# Patient Record
Sex: FEMALE | URBAN - METROPOLITAN AREA
[De-identification: names, ages, dates, MRNs, and addresses within clinical notes are randomized per-mention and may not be internally consistent; named-entity substitution may affect disease eponyms.]

---

## 2018-01-23 ENCOUNTER — APPOINTMENT (RX ONLY)
Dept: URBAN - METROPOLITAN AREA CLINIC 87 | Facility: CLINIC | Age: 73
Setting detail: DERMATOLOGY
End: 2018-01-23

## 2018-01-23 DIAGNOSIS — L81.4 OTHER MELANIN HYPERPIGMENTATION: ICD-10-CM

## 2018-01-23 DIAGNOSIS — D485 NEOPLASM OF UNCERTAIN BEHAVIOR OF SKIN: ICD-10-CM

## 2018-01-23 DIAGNOSIS — D22 MELANOCYTIC NEVI: ICD-10-CM

## 2018-01-23 DIAGNOSIS — L82.1 OTHER SEBORRHEIC KERATOSIS: ICD-10-CM

## 2018-01-23 DIAGNOSIS — L21.8 OTHER SEBORRHEIC DERMATITIS: ICD-10-CM

## 2018-01-23 DIAGNOSIS — L90.5 SCAR CONDITIONS AND FIBROSIS OF SKIN: ICD-10-CM

## 2018-01-23 PROBLEM — F41.9 ANXIETY DISORDER, UNSPECIFIED: Status: ACTIVE | Noted: 2018-01-23

## 2018-01-23 PROBLEM — F32.9 MAJOR DEPRESSIVE DISORDER, SINGLE EPISODE, UNSPECIFIED: Status: ACTIVE | Noted: 2018-01-23

## 2018-01-23 PROBLEM — I10 ESSENTIAL (PRIMARY) HYPERTENSION: Status: ACTIVE | Noted: 2018-01-23

## 2018-01-23 PROBLEM — L85.3 XEROSIS CUTIS: Status: ACTIVE | Noted: 2018-01-23

## 2018-01-23 PROBLEM — K21.9 GASTRO-ESOPHAGEAL REFLUX DISEASE WITHOUT ESOPHAGITIS: Status: ACTIVE | Noted: 2018-01-23

## 2018-01-23 PROBLEM — D23.72 OTHER BENIGN NEOPLASM OF SKIN OF LEFT LOWER LIMB, INCLUDING HIP: Status: ACTIVE | Noted: 2018-01-23

## 2018-01-23 PROBLEM — D48.5 NEOPLASM OF UNCERTAIN BEHAVIOR OF SKIN: Status: ACTIVE | Noted: 2018-01-23

## 2018-01-23 PROBLEM — M12.9 ARTHROPATHY, UNSPECIFIED: Status: ACTIVE | Noted: 2018-01-23

## 2018-01-23 PROBLEM — D22.72 MELANOCYTIC NEVI OF LEFT LOWER LIMB, INCLUDING HIP: Status: ACTIVE | Noted: 2018-01-23

## 2018-01-23 PROCEDURE — ? BIOPSY BY SHAVE METHOD

## 2018-01-23 PROCEDURE — ? COUNSELING

## 2018-01-23 PROCEDURE — 11100: CPT

## 2018-01-23 PROCEDURE — 99203 OFFICE O/P NEW LOW 30 MIN: CPT | Mod: 25

## 2018-01-23 PROCEDURE — ? OTHER

## 2018-01-23 PROCEDURE — ? DEFER

## 2018-01-23 ASSESSMENT — LOCATION SIMPLE DESCRIPTION DERM
LOCATION SIMPLE: LEFT CHEEK
LOCATION SIMPLE: RIGHT EAR
LOCATION SIMPLE: RIGHT THIGH
LOCATION SIMPLE: ANTERIOR SCALP
LOCATION SIMPLE: RIGHT FOREARM
LOCATION SIMPLE: LEFT SCALP
LOCATION SIMPLE: LEFT FOREARM
LOCATION SIMPLE: LEFT FOREHEAD
LOCATION SIMPLE: ABDOMEN
LOCATION SIMPLE: LEFT EAR
LOCATION SIMPLE: CHEST
LOCATION SIMPLE: RIGHT CHEEK
LOCATION SIMPLE: LEFT THIGH
LOCATION SIMPLE: LEFT LOWER BACK

## 2018-01-23 ASSESSMENT — LOCATION DETAILED DESCRIPTION DERM
LOCATION DETAILED: LEFT ANTERIOR PROXIMAL THIGH
LOCATION DETAILED: LEFT LATERAL SUPERIOR CHEST
LOCATION DETAILED: MID-FRONTAL SCALP
LOCATION DETAILED: LEFT PROXIMAL DORSAL FOREARM
LOCATION DETAILED: LEFT ANTERIOR LATERAL DISTAL THIGH
LOCATION DETAILED: RIGHT LATERAL SUPERIOR CHEST
LOCATION DETAILED: PERIUMBILICAL SKIN
LOCATION DETAILED: LEFT INFERIOR CENTRAL MALAR CHEEK
LOCATION DETAILED: RIGHT CRUS OF HELIX
LOCATION DETAILED: LEFT MEDIAL FRONTAL SCALP
LOCATION DETAILED: LEFT POSTERIOR EAR
LOCATION DETAILED: RIGHT INFERIOR CENTRAL MALAR CHEEK
LOCATION DETAILED: RIGHT ANTERIOR DISTAL THIGH
LOCATION DETAILED: RIGHT POSTERIOR EAR
LOCATION DETAILED: LEFT INFERIOR MEDIAL FOREHEAD
LOCATION DETAILED: LEFT CYMBA CONCHA
LOCATION DETAILED: LEFT SUPERIOR LATERAL MIDBACK
LOCATION DETAILED: RIGHT PROXIMAL RADIAL DORSAL FOREARM
LOCATION DETAILED: LEFT CENTRAL BUCCAL CHEEK

## 2018-01-23 ASSESSMENT — LOCATION ZONE DERM
LOCATION ZONE: FACE
LOCATION ZONE: ARM
LOCATION ZONE: TRUNK
LOCATION ZONE: EAR
LOCATION ZONE: LEG
LOCATION ZONE: SCALP

## 2018-01-23 NOTE — PROCEDURE: BIOPSY BY SHAVE METHOD
Bill [id number] For Specimen Handling/Conveyance To Laboratory?: no
Notification Instructions: Patient will be notified of biopsy results. However, patient instructed to call the office if not contacted within 2 weeks.
Biopsy Type: H and E
Body Location Override (Optional - Billing Will Still Be Based On Selected Body Map Location If Applicable): left chin
Lab: [id number]
Hemostasis: Aluminum Chloride
Silver Nitrate Text: The wound bed was treated with silver nitrate after the biopsy was performed.
Path Notes (To The Dermatopathologist): Check margins
Electrodesiccation And Curettage Text: The wound bed was treated with electrodesiccation and curettage after the biopsy was performed.
Consent: Verbal consent was obtained and risks were reviewed including but not limited to scarring, infection, bleeding, scabbing, incomplete removal, nerve damage and allergy to anesthesia.
Biopsy Method: Dermablade
Additional Anesthesia Volume In Cc (Will Not Render If 0): 0
Type Of Destruction Used: Curettage
Post-Care Instructions: I reviewed with the patient in detail post-care instructions. Patient is to keep the biopsy site dry overnight, and then apply vaseline twice daily until healed.
Electrodesiccation Text: The wound bed was treated with electrodesiccation after the biopsy was performed.
Cryotherapy Text: The wound bed was treated with cryotherapy after the biopsy was performed.
Anesthesia Volume In Cc (Will Not Render If 0): 0.5
Lab Facility: [id number]
Detail Level: Detailed
Wound Care: Aquaphor
Anesthesia Type: 1% lidocaine with epinephrine
Dressing: bandage
Curettage Text: The wound bed was treated with curettage after the biopsy was performed.
Billing Type: Third-Party Bill
Size Of Lesion In Cm: 0.3

## 2018-02-12 ENCOUNTER — APPOINTMENT (RX ONLY)
Dept: URBAN - METROPOLITAN AREA CLINIC 87 | Facility: CLINIC | Age: 73
Setting detail: DERMATOLOGY
End: 2018-02-12

## 2018-02-12 DIAGNOSIS — L57.0 ACTINIC KERATOSIS: ICD-10-CM

## 2018-02-12 PROCEDURE — ? OTHER

## 2018-02-12 PROCEDURE — 17003 DESTRUCT PREMALG LES 2-14: CPT

## 2018-02-12 PROCEDURE — ? COUNSELING

## 2018-02-12 PROCEDURE — ? LIQUID NITROGEN

## 2018-02-12 PROCEDURE — 17000 DESTRUCT PREMALG LESION: CPT

## 2018-02-12 ASSESSMENT — LOCATION DETAILED DESCRIPTION DERM
LOCATION DETAILED: LEFT UPPER CUTANEOUS LIP
LOCATION DETAILED: LEFT CENTRAL BUCCAL CHEEK

## 2018-02-12 ASSESSMENT — LOCATION SIMPLE DESCRIPTION DERM
LOCATION SIMPLE: LEFT LIP
LOCATION SIMPLE: LEFT CHEEK

## 2018-02-12 ASSESSMENT — LOCATION ZONE DERM
LOCATION ZONE: FACE
LOCATION ZONE: LIP

## 2018-02-12 NOTE — PROCEDURE: OTHER
Detail Level: Zone
Other (Free Text): Biopsy proven. LN2 recommended and completed.
Note Text (......Xxx Chief Complaint.): This diagnosis correlates with the

## 2018-05-14 ENCOUNTER — APPOINTMENT (RX ONLY)
Dept: URBAN - METROPOLITAN AREA CLINIC 87 | Facility: CLINIC | Age: 73
Setting detail: DERMATOLOGY
End: 2018-05-14

## 2018-05-14 DIAGNOSIS — B02.9 ZOSTER WITHOUT COMPLICATIONS: ICD-10-CM

## 2018-05-14 DIAGNOSIS — L82.1 OTHER SEBORRHEIC KERATOSIS: ICD-10-CM

## 2018-05-14 DIAGNOSIS — L57.0 ACTINIC KERATOSIS: ICD-10-CM

## 2018-05-14 PROBLEM — J45.909 UNSPECIFIED ASTHMA, UNCOMPLICATED: Status: ACTIVE | Noted: 2018-05-14

## 2018-05-14 PROCEDURE — ? PRESCRIPTION

## 2018-05-14 PROCEDURE — ? TREATMENT REGIMEN

## 2018-05-14 PROCEDURE — ? DEFER

## 2018-05-14 PROCEDURE — ? ORDER TESTS

## 2018-05-14 PROCEDURE — ? COUNSELING

## 2018-05-14 PROCEDURE — 99213 OFFICE O/P EST LOW 20 MIN: CPT

## 2018-05-14 RX ORDER — MUPIROCIN 20 MG/G
OINTMENT TOPICAL
Qty: 1 | Refills: 0 | Status: ERX | COMMUNITY
Start: 2018-05-14

## 2018-05-14 RX ORDER — VALACYCLOVIR HYDROCHLORIDE 1 G/1
TABLET, FILM COATED ORAL TID
Qty: 21 | Refills: 0 | Status: ERX | COMMUNITY
Start: 2018-05-14

## 2018-05-14 RX ADMIN — MUPIROCIN: 20 OINTMENT TOPICAL at 00:00

## 2018-05-14 RX ADMIN — VALACYCLOVIR HYDROCHLORIDE: 1 TABLET, FILM COATED ORAL at 00:00

## 2018-05-14 ASSESSMENT — LOCATION ZONE DERM
LOCATION ZONE: FACE
LOCATION ZONE: LEG

## 2018-05-14 ASSESSMENT — LOCATION DETAILED DESCRIPTION DERM
LOCATION DETAILED: LEFT DISTAL LATERAL POSTERIOR THIGH
LOCATION DETAILED: LEFT SUPERIOR CENTRAL MALAR CHEEK

## 2018-05-14 ASSESSMENT — LOCATION SIMPLE DESCRIPTION DERM
LOCATION SIMPLE: LEFT POSTERIOR THIGH
LOCATION SIMPLE: LEFT CHEEK

## 2018-05-14 NOTE — PROCEDURE: DEFER
Detail Level: Detailed
Instructions (Optional): Discussed with pt will treat after rash resolved
Procedure To Be Performed At Next Visit: Liquid nitrogen

## 2018-05-14 NOTE — PROCEDURE: TREATMENT REGIMEN
Plan: Patient reports painful rash starting last week. I suspect shingles based on exam. Cultures completed. Start Valtrex and Mupirocin. V3 dermatome affected. Pain the ear also noted. Informed patient to see ENT today, she states she can't today but will tomorrow. Follow up 1 week in our office
Samples Given: Cloderm Cream - apply to affected areas next week daily as needed for itching
Detail Level: Zone

## 2018-05-22 ENCOUNTER — APPOINTMENT (RX ONLY)
Dept: URBAN - METROPOLITAN AREA CLINIC 87 | Facility: CLINIC | Age: 73
Setting detail: DERMATOLOGY
End: 2018-05-22

## 2018-05-22 DIAGNOSIS — B02.9 ZOSTER WITHOUT COMPLICATIONS: ICD-10-CM

## 2018-05-22 PROCEDURE — 99213 OFFICE O/P EST LOW 20 MIN: CPT

## 2018-05-22 PROCEDURE — ? COUNSELING

## 2018-05-22 PROCEDURE — ? OTHER

## 2018-05-22 ASSESSMENT — LOCATION DETAILED DESCRIPTION DERM: LOCATION DETAILED: LEFT LATERAL ZYGOMA

## 2018-05-22 ASSESSMENT — LOCATION ZONE DERM: LOCATION ZONE: FACE

## 2018-05-22 ASSESSMENT — LOCATION SIMPLE DESCRIPTION DERM: LOCATION SIMPLE: LEFT ZYGOMA

## 2018-05-22 NOTE — PROCEDURE: OTHER
Other (Free Text): Patient reports symptoms have improved but still with pain. Discussed Gabapentin but patient reports overall she is better. Continue Mupirocin BID. Follow up 4 weeks. Patient seen by ENT and given Prednisone and pain medication. No ocular symptoms (pain, vision changes). Patient scheduled to see ENT next week for f/u.
Note Text (......Xxx Chief Complaint.): This diagnosis correlates with the
Detail Level: Zone

## 2018-06-19 ENCOUNTER — APPOINTMENT (RX ONLY)
Dept: URBAN - METROPOLITAN AREA CLINIC 87 | Facility: CLINIC | Age: 73
Setting detail: DERMATOLOGY
End: 2018-06-19

## 2018-06-19 DIAGNOSIS — B02.9 ZOSTER WITHOUT COMPLICATIONS: ICD-10-CM

## 2018-06-19 PROCEDURE — ? COUNSELING

## 2018-06-19 PROCEDURE — ? OTHER

## 2018-06-19 PROCEDURE — ? ORDER TESTS

## 2018-06-19 PROCEDURE — 99213 OFFICE O/P EST LOW 20 MIN: CPT

## 2018-06-19 ASSESSMENT — LOCATION DETAILED DESCRIPTION DERM: LOCATION DETAILED: LEFT SUPERIOR MEDIAL MALAR CHEEK

## 2018-06-19 ASSESSMENT — LOCATION SIMPLE DESCRIPTION DERM: LOCATION SIMPLE: LEFT CHEEK

## 2018-06-19 ASSESSMENT — LOCATION ZONE DERM: LOCATION ZONE: FACE

## 2018-06-19 NOTE — PROCEDURE: OTHER
Note Text (......Xxx Chief Complaint.): This diagnosis correlates with the
Other (Free Text): Positive viral culture. Patient completed course of Valtrex. Areas still healing and pain still noted. No new areas. Bacterial culture completed today, await results. Start Mupirocin BID x 10 days (patient has not been using). Neurologist recommended if pain worsens or does not improve. Follow up 2-3 months
Detail Level: Zone

## 2018-09-24 ENCOUNTER — APPOINTMENT (RX ONLY)
Dept: URBAN - METROPOLITAN AREA CLINIC 87 | Facility: CLINIC | Age: 73
Setting detail: DERMATOLOGY
End: 2018-09-24

## 2018-09-24 DIAGNOSIS — L57.0 ACTINIC KERATOSIS: ICD-10-CM

## 2018-09-24 PROCEDURE — 17000 DESTRUCT PREMALG LESION: CPT

## 2018-09-24 PROCEDURE — ? OTHER

## 2018-09-24 PROCEDURE — ? LIQUID NITROGEN

## 2018-09-24 PROCEDURE — ? COUNSELING

## 2018-09-24 PROCEDURE — 17003 DESTRUCT PREMALG LES 2-14: CPT

## 2018-09-24 ASSESSMENT — LOCATION DETAILED DESCRIPTION DERM
LOCATION DETAILED: LEFT INFERIOR MEDIAL MALAR CHEEK
LOCATION DETAILED: LEFT MEDIAL BUCCAL CHEEK

## 2018-09-24 ASSESSMENT — LOCATION ZONE DERM: LOCATION ZONE: FACE

## 2018-09-24 ASSESSMENT — LOCATION SIMPLE DESCRIPTION DERM: LOCATION SIMPLE: LEFT CHEEK

## 2018-09-24 NOTE — PROCEDURE: OTHER
Other (Free Text): Biopsy proven AK left chin, LN2 recommended and completed recheck at follow up
Note Text (......Xxx Chief Complaint.): This diagnosis correlates with the
Detail Level: Zone

## 2022-05-03 NOTE — PROCEDURE: DEFER
Instructions (Optional): Pt decline topical medication today but patient interested in shampoos. Samples given of [facility] Zinc Shampoo
Detail Level: Detailed
General